# Patient Record
Sex: MALE | Race: WHITE | Employment: OTHER | URBAN - METROPOLITAN AREA
[De-identification: names, ages, dates, MRNs, and addresses within clinical notes are randomized per-mention and may not be internally consistent; named-entity substitution may affect disease eponyms.]

---

## 2020-04-27 ENCOUNTER — VIRTUAL VISIT (OUTPATIENT)
Dept: NEUROLOGY | Age: 75
End: 2020-04-27

## 2020-04-27 DIAGNOSIS — H53.9 VISUAL AURA: ICD-10-CM

## 2020-04-27 DIAGNOSIS — R40.4 ALTERED AWARENESS, TRANSIENT: ICD-10-CM

## 2020-04-27 DIAGNOSIS — G43.109 OCULAR MIGRAINE: Primary | ICD-10-CM

## 2020-04-27 RX ORDER — GUAIFENESIN 100 MG/5ML
81 LIQUID (ML) ORAL DAILY
COMMUNITY

## 2020-04-27 RX ORDER — ESCITALOPRAM OXALATE 20 MG/1
20 TABLET ORAL DAILY
COMMUNITY

## 2020-04-27 RX ORDER — ATORVASTATIN CALCIUM 20 MG/1
20 TABLET, FILM COATED ORAL DAILY
COMMUNITY

## 2020-04-27 NOTE — PROGRESS NOTES
Neurology Note    Chief Complaint   Patient presents with    New Patient     TIA last 6 months migraine confusion       HPI/Subjective  Rani Kothari is a 69 y.o. male who presented with episodes of ocular migraine     He has noticed 3 episodes. His last episode was in Jan 2020. It started with an \"ocular migraine\" He is able to see zig-zag lines. There is a shimmering effect. It lasted for around 5-10 minutes. No headaches. It happened around 4-7 pm. He had confused thinking and had problems as to how to accomplish tasks. He was in front of a computer and wanted to do a search but then his memory was clouded and was not able to do the search. During another incident he was filling up his weekly meds and he did have problems filling up his meds. His 1st episode was in Sept 2019 and then in Nov 2019 and then Jan 2020. No episodes of understanding other people, was able to communicate and no weakness or numbness. He has been having ocular migraines since the last 6 years. It happens 15-20/year. He does not smoke. No HTN or DM. Current Outpatient Medications   Medication Sig    escitalopram oxalate (Lexapro) 20 mg tablet Take 20 mg by mouth daily.  aspirin 81 mg chewable tablet Take 81 mg by mouth daily.  atorvastatin (Lipitor) 20 mg tablet Take 20 mg by mouth daily.  levothyroxine (LEVOTHROID) 50 mcg tablet Take 50 mcg by mouth Daily (before breakfast).  ibuprofen (MOTRIN) 200 mg tablet Take 400 mg by mouth every six (6) hours as needed for Pain.  PV W-O ROSEMARIE/FERROUS FUMARATE/FA (M-VIT PO) Take 1 Tab by mouth daily.  vitamin c-vitamin e (CRANBERRY CONCENTRATE) cap Take 4,200 mg by mouth daily.  ascorbic acid (VITAMIN C) 500 mg tablet Take 1,000 mg by mouth daily.  cholecalciferol, vitamin D3, (VITAMIN D3) 2,000 unit tab Take 1 Tab by mouth daily.  coenzyme Q-10 (CO Q-10) 200 mg capsule Take 200 mg by mouth daily.     vit B Cmplx 3-FA-Vit C-Biotin (NEPHRO DASHAWN RX) 1- mg-mg-mcg tablet Take 1 Tab by mouth daily.  vitamin a-vitamin c-vit e-min (OCUVITE) tablet Take 1 Tab by mouth daily.  OTHER Total cleanse uric acid daily    OTHER Black cherry fruit 750 mg daily    magnesium oxide (MAG-OX) 400 mg tablet Take 400 mg by mouth daily.  riboflavin, vitamin B2, 100 mg tablet Take 100 mg by mouth daily.  red yeast rice extract 600 mg cap Take 600 mg by mouth daily.  cyanocobalamin (VITAMIN B-12) 100 mcg tablet Take 100 mcg by mouth daily.  carica papaya (PAPAYA ENZYME) tab Take 1 Tab by mouth daily. No current facility-administered medications for this visit. No Known Allergies  Past Medical History:   Diagnosis Date    GERD (gastroesophageal reflux disease)     Headache     Hypertension     Psychiatric disorder     depression    Thyroid disease      No past surgical history on file. Family History   Problem Relation Age of Onset    Heart Disease Father     Migraines Father      Social History     Tobacco Use    Smoking status: Never Smoker    Smokeless tobacco: Never Used   Substance Use Topics    Alcohol use: Yes     Alcohol/week: 11.7 standard drinks     Types: 14 Glasses of wine per week    Drug use: No       REVIEW OF SYSTEMS:   A ten system review of constitutional, cardiovascular, respiratory, musculoskeletal, endocrine, skin, SHEENT, genitourinary, psychiatric and neurologic systems was obtained and is unremarkable with the exception of altered mental status    EXAMINATION:   There were no vitals taken for this visit. General:  Exam limited because of virtual visit. General appearance: Pt is in no acute distress     Neurological Examination:   Mental Status: AAO x3. Speech is fluent. Follows commands, has normal fund of knowledge, attention, short term recall, comprehension and insight. Cranial Nerves:  Extraocular movements are full. Facial movement intact. Hearing intact to conversation. Shoulder shrug symmetric. Tongue midline. Motor: Strength is symmetric in all 4 ext. Sensation: Normal according to patient to light touch    Coordination/Cerebellar: Intact to finger-nose-finger     Gait: Casual gait is normal.     Laboratory review:   No results found for this or any previous visit. Imaging review:  1/2/2014  CT scan of the head without contrast  Normal    Documentation review:  None    Assessment/Plan:   Sue Millan is a 69 y.o. male who presented with 3 episodes of mild confusion preceded by visual aura. During these episodes there is no focal symptoms or deficits. It lasts for 5 to 10 minutes and then resolves. The patient is very active. I do not see any medications for hypertension and diabetes per the medication list.  I do suspect that these episodes are complicated migraines. His ocular migraines are changing. To rule out other etiologies such as a stroke, intracranial tumors and seizures will proceed with MRI of the brain without contrast and EEG. Follow-up in 3 months. No flowsheet data found. Primary care to address possible depression if PHQ-9 score is more than 9. ICD-10-CM ICD-9-CM    1. Ocular migraine G43.109 346.80    2. Visual aura H53.9 368.9    3. Altered awareness, transient R40.4 780.02 MRI BRAIN WO CONT      EEG      Thank you for allowing me to participate in the care of Mr. Karon Savage. Please feel free to contact me if you have any questions. Electronically signed. Ileana Painting MD  Neurologist    CC: Jessica Isabel MD  Fax: [phone]    This note was created using voice recognition software. Despite editing, there may be syntax errors. Sue Millan was seen by synchronous (real-time) audio-video technology on 04/27/20. Consent:  He and/or his healthcare decision maker is aware that this patient-initiated Telehealth encounter is a billable service, with coverage as determined by his insurance carrier.  He is aware that he may receive a usman and has provided verbal consent to proceed: Yes    I was in the office while conducting this encounter. Pursuant to the emergency declaration under the [facility] waiver authority and the [employer] and [employer] Act, this Virtual  Visit was conducted, with patient's consent, to reduce the patient's risk of exposure to COVID-19 and provide continuity of care for an established patient. Services were provided through a video synchronous discussion virtually to substitute for in-person clinic visit.

## 2020-04-27 NOTE — PATIENT INSTRUCTIONS

## 2020-05-26 ENCOUNTER — HOSPITAL ENCOUNTER (OUTPATIENT)
Dept: NEUROLOGY | Age: 75
Discharge: HOME | End: 2020-05-26
Attending: PSYCHIATRY & NEUROLOGY
Payer: MEDICARE

## 2020-05-26 ENCOUNTER — HOSPITAL ENCOUNTER (OUTPATIENT)
Dept: MRI IMAGING | Age: 75
Discharge: HOME | End: 2020-05-26
Attending: PSYCHIATRY & NEUROLOGY
Payer: MEDICARE

## 2020-05-26 DIAGNOSIS — R40.4 ALTERED AWARENESS, TRANSIENT: ICD-10-CM

## 2020-05-26 PROCEDURE — 95816 EEG AWAKE AND DROWSY: CPT

## 2020-05-26 PROCEDURE — 70551 MRI BRAIN STEM W/O DYE: CPT

## 2020-05-26 NOTE — PROCEDURES
Patient Name: Edouard Francis  : 1945  Age: 69 y.o. Ordering physician: Amaya Rosenbaum  Date of EE20   EEG procedure number: [id number]  Diagnosis: alt mental status  Interpreting physician: Amaya Rosenbaum MD      ELECTROENCEPHALOGRAM REPORT     PROCEDURE: EEG. CLINICAL INDICATION: The patient is a 69 y.o. male who is being evaluated for baseline electro cerebral activities and to rule out seizure focus. EEG CLASSIFICATION: Normal    DESCRIPTION OF THE RECORD:   The background of this recording contains a posteriorly-located occipital alpha rhythm of 8 Hz that attenuates with eye opening. Throughout the recording, there were no clear areas of focal slowing nor spike or spike-and-wave discharges seen. Hyperventilation was not performed. Photic stimulation produced no response in the posterior head regions. During the recording the patient did not achieve stage II sleep    INTERPRETATION: This is a normal electroencephalogram with the patient awake, showing no clear focal abnormalities or epileptiform activity. A normal EEG doesn't not rule out seizures. Clinical correlation recommended.       Amaya Rosenbaum MD  Neurologist

## 2020-07-10 ENCOUNTER — OFFICE VISIT (OUTPATIENT)
Dept: NEUROLOGY | Age: 75
End: 2020-07-10

## 2020-07-10 VITALS
BODY MASS INDEX: 27.11 KG/M2 | WEIGHT: 183 LBS | HEIGHT: 69 IN | HEART RATE: 81 BPM | SYSTOLIC BLOOD PRESSURE: 142 MMHG | OXYGEN SATURATION: 98 % | DIASTOLIC BLOOD PRESSURE: 85 MMHG

## 2020-07-10 DIAGNOSIS — G43.109 OCULAR MIGRAINE: Primary | ICD-10-CM

## 2020-07-10 DIAGNOSIS — R40.4 ALTERED AWARENESS, TRANSIENT: ICD-10-CM

## 2020-07-10 DIAGNOSIS — H53.9 VISUAL AURA: ICD-10-CM

## 2020-07-10 NOTE — PROGRESS NOTES
Neurology Note    Chief Complaint   Patient presents with    Follow-up    Headache       HPI/Subjective  Ramandeep Ortiz is a 69 y.o. male who presented with episodes of ocular migraine     He has noticed 3 episodes. His last episode was in Jan 2020. It started with an \"ocular migraine\" He is able to see zig-zag lines. There is a shimmering effect. It lasted for around 5-10 minutes. No headaches. It happened around 4-7 pm. He had confused thinking and had problems as to how to accomplish tasks. He was in front of a computer and wanted to do a search but then his memory was clouded and was not able to do the search. During another incident he was filling up his weekly meds and he did have problems filling up his meds. His 1st episode was in Sept 2019 and then in Nov 2019 and then Jan 2020. No episodes of understanding other people, was able to communicate and no weakness or numbness. He has been having ocular migraines since the last 6 years. It happens 15-20/year. He does not smoke. No HTN or DM. Interval history:  The patient did have EEG and MRI of the brain and they are normal.  Patient has not had any more episodes of ocular migraines since January. Current Outpatient Medications   Medication Sig    escitalopram oxalate (Lexapro) 20 mg tablet Take 20 mg by mouth daily.  aspirin 81 mg chewable tablet Take 81 mg by mouth daily.  atorvastatin (Lipitor) 20 mg tablet Take 20 mg by mouth daily.  levothyroxine (LEVOTHROID) 50 mcg tablet Take 50 mcg by mouth Daily (before breakfast).  ibuprofen (MOTRIN) 200 mg tablet Take 400 mg by mouth every six (6) hours as needed for Pain.  ascorbic acid (VITAMIN C) 500 mg tablet Take 1,000 mg by mouth daily.  cholecalciferol, vitamin D3, (VITAMIN D3) 2,000 unit tab Take 1 Tab by mouth daily.  coenzyme Q-10 (CO Q-10) 200 mg capsule Take 200 mg by mouth daily.  magnesium oxide (MAG-OX) 400 mg tablet Take 400 mg by mouth daily.     cyanocobalamin (VITAMIN B-12) 100 mcg tablet Take 100 mcg by mouth daily.  carica papaya (PAPAYA ENZYME) tab Take 1 Tab by mouth daily.  PV W-O ROSEMARIE/FERROUS FUMARATE/FA (M-VIT PO) Take 1 Tab by mouth daily.  vitamin c-vitamin e (CRANBERRY CONCENTRATE) cap Take 4,200 mg by mouth daily.  vit B Cmplx 3-FA-Vit C-Biotin (NEPHRO DASHAWN RX) 1- mg-mg-mcg tablet Take 1 Tab by mouth daily.  vitamin a-vitamin c-vit e-min (OCUVITE) tablet Take 1 Tab by mouth daily.  OTHER Total cleanse uric acid daily    OTHER Black cherry fruit 750 mg daily    riboflavin, vitamin B2, 100 mg tablet Take 100 mg by mouth daily.  red yeast rice extract 600 mg cap Take 600 mg by mouth daily. No current facility-administered medications for this visit. No Known Allergies  Past Medical History:   Diagnosis Date    GERD (gastroesophageal reflux disease)     Headache     Hypertension     Psychiatric disorder     depression    Thyroid disease      No past surgical history on file. Family History   Problem Relation Age of Onset    Heart Disease Father     Migraines Father      Social History     Tobacco Use    Smoking status: Never Smoker    Smokeless tobacco: Never Used   Substance Use Topics    Alcohol use:  Yes     Alcohol/week: 11.7 standard drinks     Types: 14 Glasses of wine per week    Drug use: No       REVIEW OF SYSTEMS:   A ten system review of constitutional, cardiovascular, respiratory, musculoskeletal, endocrine, skin, SHEENT, genitourinary, psychiatric and neurologic systems was obtained and is unremarkable with the exception of altered mental status    EXAMINATION:   Visit Vitals  /85   Pulse 81   Ht 5' 9\" (1.753 m)   Wt 183 lb (83 kg)   SpO2 98%   BMI 27.02 kg/m²        EXAMINATION  Visit Vitals  /85   Pulse 81   Ht 5' 9\" (1.753 m)   Wt 183 lb (83 kg)   SpO2 98%   BMI 27.02 kg/m²        General:   General appearance: Pt is in no acute distress   Distal pulses are preserved    Neurological Examination:   Mental Status: AAO x3. Speech is fluent. Follows commands, has normal fund of knowledge, attention, short term recall, comprehension and insight. Cranial Nerves: Visual fields are full. PERRL, Extraocular movements are full. Facial sensation intact V1- V3. Facial movement intact, symmetric. Hearing intact to conversation. Palate elevates symmetrically. Shoulder shrug symmetric. Tongue midline. Motor: Strength is 5/5 in all 4 ext. Normal tone. No atrophy. Sensation: Light touch - Normal    Coordination/Cerebellar: Intact to finger-nose-finger     Gait: Romberg is negative and casual gait is normal.     Laboratory review:   No results found for this or any previous visit. Imaging review:  1/2/2014  CT scan of the head without contrast  Normal    EEG  This is a normal electroencephalogram with the patient awake, showing no clear focal abnormalities or epileptiform activity. A normal EEG doesn't not rule out seizures. Clinical correlation recommended. MRI brain  Normal    Documentation review:  None    Assessment/Plan:   Sharifa Orellana is a 69 y.o. male who presented with 3 episodes of mild confusion preceded by visual aura. Last episode was in January 2020 during these episodes there is no focal symptoms or deficits. It lasts for 5 to 10 minutes and then resolves. The patient is very active. The patient did have MRI of the brain and EEG. I suspect that he is having ocular migraines. No treatment at this time since his symptoms are infrequent. Follow-up as needed. No flowsheet data found. Primary care to address possible depression if PHQ-9 score is more than 9. ICD-10-CM ICD-9-CM    1. Ocular migraine  G43.109 346.80    2. Visual aura  H53.9 368.9    3. Altered awareness, transient  R40.4 780.02       Thank you for allowing me to participate in the care of Mr. John Salcedo.  Please feel free to contact me if you have any questions. Electronically signed. Ayo Mckenna MD  Neurologist    CC: Martina Cisneros MD  Fax: [phone]    This note was created using voice recognition software. Despite editing, there may be syntax errors.

## 2023-05-10 RX ORDER — ESCITALOPRAM OXALATE 20 MG/1
20 TABLET ORAL DAILY
COMMUNITY

## 2023-05-10 RX ORDER — LEVOTHYROXINE SODIUM 0.05 MG/1
TABLET ORAL
COMMUNITY

## 2023-05-10 RX ORDER — IBUPROFEN 200 MG
TABLET ORAL EVERY 6 HOURS PRN
COMMUNITY

## 2023-05-10 RX ORDER — ASCORBIC ACID 500 MG
1000 TABLET ORAL DAILY
COMMUNITY

## 2023-05-10 RX ORDER — ASPIRIN 81 MG/1
81 TABLET, CHEWABLE ORAL DAILY
COMMUNITY

## 2023-05-10 RX ORDER — MAGNESIUM OXIDE 400 MG/1
400 TABLET ORAL DAILY
COMMUNITY

## 2023-05-10 RX ORDER — UBIDECARENONE 200 MG
200 CAPSULE ORAL DAILY
COMMUNITY

## 2023-05-10 RX ORDER — ATORVASTATIN CALCIUM 20 MG/1
20 TABLET, FILM COATED ORAL DAILY
COMMUNITY